# Patient Record
(demographics unavailable — no encounter records)

---

## 2020-02-04 NOTE — REP
CT of the abdomen and pelvis with IV contrast, without bowel contrast for right

lower quadrant abdominal pain:

There are no comparisons.

The visualized lung fields are unremarkable.

The hepatic parenchyma is homogeneous.  The gallbladder, pancreas, spleen,

adrenals, kidneys and abdominal aorta are unremarkable.

There is no ascites.  No inflammatory changes in the mesentery.  No bowel

distension or obstruction.  No ascites.

Pelvis:

The appendix cannot be identified, however, there is no pericecal inflammation or

abscess.

There is a trace of free fluid in the pelvis.  There is an IUD in the uterus.

The left adnexa is unremarkable.  The right adnexa is obscured by bowel.

Impression:

The gallbladder is unremarkable.

There is no hydronephrosis.

The left adnexa is unremarkable.

The appendix cannot be identified, however, there is no pericecal inflammation or

abscess.

The right adnexa is obscured by bowel.

There is an IUD in the uterus.

There is a trace of free fluid in the pelvis.

 

 

Electronically Signed by

Ezra Gonzalez MD 02/04/2020 04:18 P

## 2020-02-05 NOTE — ECGEPIP
[facility] - ED

                                       

                                       Test Date:    2020

Pat Name:     KATALINA MITCHELL             Department:   

Patient ID:   [id number]                 Room:         -

Gender:       Female                   Technician:   london

:          1994               Requested By: LETICIA COVINGTON

Order Number: [id number]     Reading MD:   Kirstie Rios

                                 Measurements

Intervals                              Axis          

Rate:         91                       P:            36

MA:           140                      QRS:          28

QRSD:         87                       T:            40

QT:           353                                    

QTc:          436                                    

                           Interpretive Statements

SINUS RHYTHM

NO PRIOR

Electronically Signed on 2020 14:56:57 EST by Kirstie Rios